# Patient Record
Sex: FEMALE | Race: WHITE | NOT HISPANIC OR LATINO | URBAN - METROPOLITAN AREA
[De-identification: names, ages, dates, MRNs, and addresses within clinical notes are randomized per-mention and may not be internally consistent; named-entity substitution may affect disease eponyms.]

---

## 2018-03-29 ENCOUNTER — INPATIENT HOSPITAL (OUTPATIENT)
Dept: URBAN - METROPOLITAN AREA HOSPITAL 107 | Facility: HOSPITAL | Age: 63
End: 2018-03-29
Payer: MEDICARE

## 2018-03-29 DIAGNOSIS — R10.31 RIGHT LOWER QUADRANT PAIN: ICD-10-CM

## 2018-03-29 DIAGNOSIS — R93.3 ABNORMAL FINDINGS ON DIAGNOSTIC IMAGING OF OTHER PARTS OF DI: ICD-10-CM

## 2018-03-29 DIAGNOSIS — R18.8 OTHER ASCITES: ICD-10-CM

## 2018-03-29 DIAGNOSIS — R93.2 ABNORMAL FINDINGS ON DIAGNOSTIC IMAGING OF LIVER AND BILIARY: ICD-10-CM

## 2018-03-29 DIAGNOSIS — K37 UNSPECIFIED APPENDICITIS: ICD-10-CM

## 2018-03-29 PROCEDURE — 99253 IP/OBS CNSLTJ NEW/EST LOW 45: CPT

## 2018-05-21 ENCOUNTER — OFFICE VISIT (OUTPATIENT)
Dept: GASTROENTEROLOGY | Facility: CLINIC | Age: 63
End: 2018-05-21

## 2018-05-21 VITALS
DIASTOLIC BLOOD PRESSURE: 66 MMHG | WEIGHT: 287.8 LBS | HEIGHT: 64 IN | SYSTOLIC BLOOD PRESSURE: 124 MMHG | BODY MASS INDEX: 49.13 KG/M2 | TEMPERATURE: 97.9 F

## 2018-05-21 DIAGNOSIS — K63.5 POLYP OF COLON, UNSPECIFIED PART OF COLON, UNSPECIFIED TYPE: ICD-10-CM

## 2018-05-21 DIAGNOSIS — K74.69 OTHER CIRRHOSIS OF LIVER (HCC): Primary | ICD-10-CM

## 2018-05-21 PROCEDURE — 99204 OFFICE O/P NEW MOD 45 MIN: CPT | Performed by: INTERNAL MEDICINE

## 2018-05-21 RX ORDER — ATORVASTATIN CALCIUM 20 MG/1
TABLET, FILM COATED ORAL
COMMUNITY

## 2018-05-21 RX ORDER — FUROSEMIDE 40 MG/1
TABLET ORAL
COMMUNITY

## 2018-05-21 RX ORDER — LISINOPRIL 20 MG/1
TABLET ORAL
COMMUNITY

## 2018-05-21 RX ORDER — DIGOXIN 250 MCG
TABLET ORAL DAILY
COMMUNITY
Start: 2014-02-21

## 2018-05-21 NOTE — PROGRESS NOTES
Chief Complaint   Patient presents with   • Cirrhosis       History of Present Illness: 63 yo female who was told that she has cirrhosis in 3/18 found on CT abd/pelvis. No abdominal or chest pain. No nausea or vomiting. No ffevers, chills. Her weight has been stable. NO SOA except FRAZIER. No rectal bleeding or melena. Occasional diarrhea, rare constiaption. NO jaundice or hepatitis. She is now getting the hep A and Hep B vaccines. Rare ETOH. NO IV drugs.     Past Medical History:   Diagnosis Date   • A-fib    • Congestive heart failure    • Diabetes mellitus    • History of breast cancer    • History of uterine cancer    • Hypertension    • Uterine cancer        Past Surgical History:   Procedure Laterality Date   • APPENDECTOMY     • BILATERAL SALPINGO OOPHORECTOMY     • BREAST BIOPSY  1997   • BREAST LUMPECTOMY  1998   • COLONOSCOPY  12/01/2014    [facility]   • HAND SURGERY     • HYSTERECTOMY  2005   • MEDIPORT INSERTION, SINGLE  1998         Current Outpatient Prescriptions:   •  atorvastatin (LIPITOR) 20 MG tablet, atorvastatin 20 mg tablet, Disp: , Rfl:   •  digoxin (LANOXIN) 250 MCG tablet, Take  by mouth Daily., Disp: , Rfl:   •  furosemide (LASIX) 40 MG tablet, furosemide 40 mg tablet, Disp: , Rfl:   •  FUROSEMIDE PO, Take  by mouth., Disp: , Rfl:   •  glucose blood test strip, Accu-Chek Compact Test strips, Disp: , Rfl:   •  hepatitis A-hepatitis B (TWINRIX) 720-20 ELU-MCG/ML injection, Twinrix (PF) 720 SHEN unit-20 mcg/mL intramuscular syringe, Disp: , Rfl:   •  Insulin Glargine 300 UNIT/ML solution pen-injector, Inject 75 Units under the skin., Disp: , Rfl:   •  insulin lispro (HUMALOG) 100 UNIT/ML injection, Humalog U-100 Insulin 100 unit/mL subcutaneous solution  40 units tid with meals, Disp: , Rfl:   •  lisinopril (PRINIVIL,ZESTRIL) 20 MG tablet, lisinopril 20 mg tablet, Disp: , Rfl:   •  Multiple Vitamins-Minerals (CENTRUM SILVER 50+MEN PO), Centrum Silver  1 A DAY, Disp: , Rfl:   •  Polyethylene Glycol 3350  (MIRALAX PO), 17 g., Disp: , Rfl:     Allergies   Allergen Reactions   • Penicillins Shortness Of Breath and Swelling       History reviewed. No pertinent family history.    Social History     Social History   • Marital status:      Spouse name: N/A   • Number of children: N/A   • Years of education: N/A     Occupational History   • Not on file.     Social History Main Topics   • Smoking status: Former Smoker   • Smokeless tobacco: Never Used   • Alcohol use No   • Drug use: No   • Sexual activity: Not on file     Other Topics Concern   • Not on file     Social History Narrative   • No narrative on file       Review of Systems   All other systems reviewed and are negative.      Vitals:    05/21/18 0930   BP: 124/66   Temp: 97.9 °F (36.6 °C)       Physical Exam   Constitutional: She is oriented to person, place, and time. She appears well-developed and well-nourished. No distress.   HENT:   Head: Normocephalic and atraumatic. Hair is normal.   Right Ear: Hearing, tympanic membrane, external ear and ear canal normal. No drainage. No decreased hearing is noted.   Left Ear: Hearing, tympanic membrane, external ear and ear canal normal. No decreased hearing is noted.   Nose: No nasal deformity.   Mouth/Throat: Oropharynx is clear and moist.   Eyes: Conjunctivae, EOM and lids are normal. Pupils are equal, round, and reactive to light. Right eye exhibits no discharge. Left eye exhibits no discharge.   Neck: Normal range of motion. Neck supple. No JVD present. No tracheal deviation present. No thyromegaly present.   Cardiovascular: Normal rate, regular rhythm, normal heart sounds, intact distal pulses and normal pulses.  Exam reveals no gallop and no friction rub.    No murmur heard.  Pulmonary/Chest: Effort normal and breath sounds normal. No respiratory distress. She has no wheezes. She has no rales. She exhibits no tenderness.   Abdominal: Soft. Bowel sounds are normal. She exhibits no distension and no mass.  There is no tenderness. There is no rebound and no guarding. No hernia.   Genitourinary: Rectal exam shows guaiac negative stool.   Musculoskeletal: Normal range of motion. She exhibits no edema, tenderness or deformity.   Lymphadenopathy:     She has no cervical adenopathy.   Neurological: She is alert and oriented to person, place, and time. She has normal reflexes. She displays normal reflexes. No cranial nerve deficit. She exhibits normal muscle tone. Coordination normal.   Skin: Skin is warm and dry. No rash noted. She is not diaphoretic. No erythema.   Psychiatric: She has a normal mood and affect. Her behavior is normal. Judgment and thought content normal.   Vitals reviewed.      Rozina was seen today for cirrhosis.    Diagnoses and all orders for this visit:    Other cirrhosis of liver  -     Celiac Ab tTG DGP TIgA  -     Comprehensive Metabolic Panel  -     Iron Profile  -     Protein Elec + Interp, Serum  -     Ferritin  -     Iron Profile  -     AFP Tumor Marker  -     Protime-INR  -     AREN  -     Anti-Smooth Muscle Antibody Titer  -     Ceruloplasmin  -     Mitochondrial Antibodies, M2    Polyp of colon, unspecified part of colon, unspecified type       Assessment:  1) h/o colon polyps  2) Fatty liver  3) Cirrhosis    Recommendations:  1) Labs:   2) Weight loss !!  3) US of the liver every 6 mos.  4) f/u 6 mos. Consider a c/s + EGD      No Follow-up on file.    Brown Pond MD  5/21/2018

## 2018-05-22 LAB
ACTIN IGG SERPL-ACNC: 10 UNITS (ref 0–19)
AFP-TM SERPL-MCNC: 5.7 NG/ML (ref 0–8.3)
ALBUMIN SERPL ELPH-MCNC: 3.1 G/DL (ref 2.9–4.4)
ALBUMIN SERPL-MCNC: 4.2 G/DL (ref 3.5–5.2)
ALBUMIN/GLOB SERPL: 0.9 {RATIO} (ref 0.7–1.7)
ALBUMIN/GLOB SERPL: 1.7 G/DL
ALP SERPL-CCNC: 85 U/L (ref 39–117)
ALPHA1 GLOB SERPL ELPH-MCNC: 0.2 G/DL (ref 0–0.4)
ALPHA2 GLOB SERPL ELPH-MCNC: 0.6 G/DL (ref 0.4–1)
ALT SERPL-CCNC: 7 U/L (ref 1–33)
ANA SER QL: NEGATIVE
AST SERPL-CCNC: 10 U/L (ref 1–32)
B-GLOBULIN SERPL ELPH-MCNC: 1.1 G/DL (ref 0.7–1.3)
BILIRUB SERPL-MCNC: 0.4 MG/DL (ref 0.1–1.2)
BUN SERPL-MCNC: 17 MG/DL (ref 8–23)
BUN/CREAT SERPL: 24.6 (ref 7–25)
CALCIUM SERPL-MCNC: 8.8 MG/DL (ref 8.6–10.5)
CERULOPLASMIN SERPL-MCNC: 22.5 MG/DL (ref 19–39)
CHLORIDE SERPL-SCNC: 102 MMOL/L (ref 98–107)
CO2 SERPL-SCNC: 24.5 MMOL/L (ref 22–29)
CREAT SERPL-MCNC: 0.69 MG/DL (ref 0.57–1)
FERRITIN SERPL-MCNC: 61.81 NG/ML (ref 13–150)
GAMMA GLOB SERPL ELPH-MCNC: 1.7 G/DL (ref 0.4–1.8)
GFR SERPLBLD CREATININE-BSD FMLA CKD-EPI: 105 ML/MIN/1.73
GFR SERPLBLD CREATININE-BSD FMLA CKD-EPI: 86 ML/MIN/1.73
GLIADIN PEPTIDE IGA SER-ACNC: 9 UNITS (ref 0–19)
GLIADIN PEPTIDE IGG SER-ACNC: 5 UNITS (ref 0–19)
GLOBULIN SER CALC-MCNC: 2.5 GM/DL
GLOBULIN SER CALC-MCNC: 3.6 G/DL (ref 2.2–3.9)
GLUCOSE SERPL-MCNC: 99 MG/DL (ref 65–99)
IGA SERPL-MCNC: 747 MG/DL (ref 87–352)
INR PPP: 1.21 (ref 0.9–1.1)
IRON SATN MFR SERPL: 16 % (ref 20–50)
IRON SERPL-MCNC: 55 MCG/DL (ref 37–145)
LABORATORY COMMENT REPORT: NORMAL
M PROTEIN SERPL ELPH-MCNC: NORMAL G/DL
MITOCHONDRIA M2 IGG SER-ACNC: <20 UNITS (ref 0–20)
POTASSIUM SERPL-SCNC: 4.7 MMOL/L (ref 3.5–5.2)
PROT PATTERN SERPL ELPH-IMP: NORMAL
PROT SERPL-MCNC: 6.7 G/DL (ref 6–8.5)
PROTHROMBIN TIME: 15.1 SECONDS (ref 11.7–14.2)
SODIUM SERPL-SCNC: 143 MMOL/L (ref 136–145)
TIBC SERPL-MCNC: 338 MCG/DL
TTG IGA SER-ACNC: 2 U/ML (ref 0–3)
TTG IGG SER-ACNC: 3 U/ML (ref 0–5)
UIBC SERPL-MCNC: 283 MCG/DL

## 2018-05-22 NOTE — PROGRESS NOTES
Tell her that her labs look good, suggesting that her cirrhosis is from fatty liver?  I would continue weight loss.

## 2018-05-25 ENCOUNTER — TELEPHONE (OUTPATIENT)
Dept: GASTROENTEROLOGY | Facility: CLINIC | Age: 63
End: 2018-05-25

## 2018-06-08 NOTE — TELEPHONE ENCOUNTER
Call from pt.  Advise per Dr Pond that labs look good, suggesting that cirrhosis is from fatty liver.  Would continue wt loss.    Pt verb understanding.  Asking if still needs to have US every 6 mo's as advsied with o/v of 5/21.  If so, needs order placed.    Message to Dr Pond.

## 2018-06-08 NOTE — TELEPHONE ENCOUNTER
Tell her that since she has cirrhosis her risk of getting liver cancer is fairly high so it is recommended that you have an US of the liver (or other liver imaging modality) every 6 mos. If she is in agreement then please order this US liver to be done every 6 mos and I will cosign it. [email or web address]

## 2018-06-25 ENCOUNTER — TELEPHONE (OUTPATIENT)
Dept: GASTROENTEROLOGY | Facility: CLINIC | Age: 63
End: 2018-06-25

## 2018-06-25 DIAGNOSIS — K74.69 OTHER CIRRHOSIS OF LIVER (HCC): Primary | ICD-10-CM

## 2018-06-25 NOTE — TELEPHONE ENCOUNTER
----- Message from Katiuska Estrada RN sent at 6/25/2018 11:18 AM EDT -----  Regarding: Patient returned call regarding testing  Patient stated that she received a letter in the mail stating that she needed to schedule testing(she is not sure what kind of testing).  #  [phone]

## 2018-06-25 NOTE — TELEPHONE ENCOUNTER
See note of 5/25.    Call to pt.  Advise per DR Pond that because has cirrhosis, risk of getting liver cancer is fairly high.  Is recommended have an US of the liver every 6 mo's.     Pt in agreement.      Standing order placed for liver us every 6 mo's x4.  Message to Dr Pond.

## 2018-06-29 ENCOUNTER — HOSPITAL ENCOUNTER (OUTPATIENT)
Dept: ULTRASOUND IMAGING | Facility: HOSPITAL | Age: 63
Discharge: HOME | End: 2018-06-29
Attending: INTERNAL MEDICINE | Admitting: INTERNAL MEDICINE

## 2018-06-29 DIAGNOSIS — K74.69 OTHER CIRRHOSIS OF LIVER (HCC): ICD-10-CM

## 2018-06-29 PROCEDURE — 76705 ECHO EXAM OF ABDOMEN: CPT

## 2018-07-02 NOTE — PROGRESS NOTES
Tell her that her ultrasound of the liver does show cirrhosis but no evidence of any liver masses, which is good.  I recommend a repeat ultrasound of the liver in 6 months.

## 2018-08-15 ENCOUNTER — TELEPHONE (OUTPATIENT)
Dept: GASTROENTEROLOGY | Facility: CLINIC | Age: 63
End: 2018-08-15

## 2018-08-15 DIAGNOSIS — K74.60 CIRRHOSIS OF LIVER WITHOUT ASCITES, UNSPECIFIED HEPATIC CIRRHOSIS TYPE (HCC): Primary | ICD-10-CM

## 2018-08-15 NOTE — TELEPHONE ENCOUNTER
----- Message from Brown Pond MD sent at 7/2/2018  8:21 AM EDT -----  Tell her that her ultrasound of the liver does show cirrhosis but no evidence of any liver masses, which is good.  I recommend a repeat ultrasound of the liver in 6 months.

## 2018-08-15 NOTE — TELEPHONE ENCOUNTER
Called pt and advised per Dr Pond that the us of the liver does show cirrhosis but no evidence of any liver masses , which is good.  He recommends a repeat us of the liver in 6 mo. Pt verb understanding.   Us of liver ordered for 6 mo.

## 2018-11-29 ENCOUNTER — OFFICE VISIT (OUTPATIENT)
Dept: GASTROENTEROLOGY | Facility: CLINIC | Age: 63
End: 2018-11-29

## 2018-11-29 VITALS
SYSTOLIC BLOOD PRESSURE: 138 MMHG | DIASTOLIC BLOOD PRESSURE: 84 MMHG | BODY MASS INDEX: 49.64 KG/M2 | WEIGHT: 290.8 LBS | HEIGHT: 64 IN | TEMPERATURE: 97.9 F

## 2018-11-29 DIAGNOSIS — K63.5 POLYP OF COLON, UNSPECIFIED PART OF COLON, UNSPECIFIED TYPE: Primary | ICD-10-CM

## 2018-11-29 DIAGNOSIS — K75.81 NASH (NONALCOHOLIC STEATOHEPATITIS): ICD-10-CM

## 2018-11-29 DIAGNOSIS — K74.69 OTHER CIRRHOSIS OF LIVER (HCC): ICD-10-CM

## 2018-11-29 PROCEDURE — 99214 OFFICE O/P EST MOD 30 MIN: CPT | Performed by: INTERNAL MEDICINE

## 2018-11-29 RX ORDER — DULAGLUTIDE 1.5 MG/.5ML
INJECTION, SOLUTION SUBCUTANEOUS
COMMUNITY
Start: 2018-11-27

## 2018-11-29 RX ORDER — INSULIN DEGLUDEC 200 U/ML
INJECTION, SOLUTION SUBCUTANEOUS
COMMUNITY
Start: 2018-11-08

## 2018-11-29 NOTE — PROGRESS NOTES
Chief Complaint   Patient presents with   • Follow-up   • Cirrhosis       History of Present Illness: 64 yo female with MORENO cirrhosis. We reviewed her last colonoscopy done in 12/14. She feels OK. Energy level not great. NO itching. No abdominal or chest pain. No nausea or vomting. No diarrhea or constipation. No rectal bleeding or melena. She cannot lose weight.     Past Medical History:   Diagnosis Date   • A-fib (CMS/HCC)    • Congestive heart failure (CMS/HCC)    • Diabetes mellitus (CMS/HCC)    • History of breast cancer    • History of uterine cancer    • Hypertension    • Uterine cancer (CMS/HCC)        Past Surgical History:   Procedure Laterality Date   • APPENDECTOMY     • BILATERAL SALPINGO OOPHORECTOMY     • BREAST BIOPSY  1997   • BREAST LUMPECTOMY  1998   • COLONOSCOPY  12/01/2014    [facility]   • HAND SURGERY     • HYSTERECTOMY  2005   • MEDIPORT INSERTION, SINGLE  1998         Current Outpatient Medications:   •  atorvastatin (LIPITOR) 20 MG tablet, atorvastatin 20 mg tablet, Disp: , Rfl:   •  Cholecalciferol (VITAMIN D PO), ergocalciferol (vitamin D2) 50,000 unit capsule  1 a week, Disp: , Rfl:   •  digoxin (LANOXIN) 250 MCG tablet, Take  by mouth Daily., Disp: , Rfl:   •  furosemide (LASIX) 40 MG tablet, furosemide 40 mg tablet, Disp: , Rfl:   •  FUROSEMIDE PO, Take  by mouth., Disp: , Rfl:   •  Glucosamine HCl (GLUCOSAMINE PO), Take  by mouth., Disp: , Rfl:   •  glucose blood test strip, Accu-Chek Compact Test strips, Disp: , Rfl:   •  insulin lispro (HUMALOG) 100 UNIT/ML injection, Humalog U-100 Insulin 100 unit/mL subcutaneous solution  40 units tid with meals, Disp: , Rfl:   •  lisinopril (PRINIVIL,ZESTRIL) 20 MG tablet, lisinopril 20 mg tablet, Disp: , Rfl:   •  Multiple Vitamins-Minerals (CENTRUM SILVER 50+MEN PO), Centrum Silver  1 A DAY, Disp: , Rfl:   •  Polyethylene Glycol 3350 (MIRALAX PO), 17 g., Disp: , Rfl:   •  TRESIBA FLEXTOUCH 200 UNIT/ML solution pen-injector, , Disp: , Rfl:   •   TRULICITY 1.5 MG/0.5ML solution pen-injector, , Disp: , Rfl:   •  hepatitis A-hepatitis B (TWINRIX) 720-20 ELU-MCG/ML injection, Twinrix (PF) 720 SHEN unit-20 mcg/mL intramuscular syringe, Disp: , Rfl:   •  Insulin Glargine 300 UNIT/ML solution pen-injector, Inject 75 Units under the skin., Disp: , Rfl:     Allergies   Allergen Reactions   • Penicillins Shortness Of Breath and Swelling       History reviewed. No pertinent family history.    Social History     Socioeconomic History   • Marital status:      Spouse name: Not on file   • Number of children: Not on file   • Years of education: Not on file   • Highest education level: Not on file   Social Needs   • Financial resource strain: Not on file   • Food insecurity - worry: Not on file   • Food insecurity - inability: Not on file   • Transportation needs - medical: Not on file   • Transportation needs - non-medical: Not on file   Occupational History   • Not on file   Tobacco Use   • Smoking status: Former Smoker   • Smokeless tobacco: Never Used   Substance and Sexual Activity   • Alcohol use: No   • Drug use: No   • Sexual activity: Not on file   Other Topics Concern   • Not on file   Social History Narrative   • Not on file       Review of Systems   All other systems reviewed and are negative.      Vitals:    11/29/18 0917   BP: 138/84   Temp: 97.9 °F (36.6 °C)       Physical Exam   Constitutional: She is oriented to person, place, and time. She appears well-developed and well-nourished. No distress.   Obese   HENT:   Head: Normocephalic and atraumatic. Hair is normal.   Right Ear: Hearing, tympanic membrane, external ear and ear canal normal. No drainage. No decreased hearing is noted.   Left Ear: Hearing, tympanic membrane, external ear and ear canal normal. No decreased hearing is noted.   Nose: No nasal deformity.   Mouth/Throat: Oropharynx is clear and moist.   Eyes: Conjunctivae, EOM and lids are normal. Pupils are equal, round, and reactive to  light. Right eye exhibits no discharge. Left eye exhibits no discharge.   Neck: Normal range of motion. Neck supple. No JVD present. No tracheal deviation present. No thyromegaly present.   Cardiovascular: Normal rate, regular rhythm, normal heart sounds, intact distal pulses and normal pulses. Exam reveals no gallop and no friction rub.   No murmur heard.  Pulmonary/Chest: Effort normal and breath sounds normal. No respiratory distress. She has no wheezes. She has no rales. She exhibits no tenderness.   Abdominal: Soft. Bowel sounds are normal. She exhibits no distension and no mass. There is no tenderness. There is no rebound and no guarding. No hernia.   obese   Musculoskeletal: Normal range of motion. She exhibits no edema, tenderness or deformity.   Lymphadenopathy:     She has no cervical adenopathy.   Neurological: She is alert and oriented to person, place, and time. She has normal reflexes. She displays normal reflexes. No cranial nerve deficit. She exhibits normal muscle tone. Coordination normal.   Skin: Skin is warm and dry. No rash noted. She is not diaphoretic. No erythema.   Psychiatric: She has a normal mood and affect. Her behavior is normal. Judgment and thought content normal.   Vitals reviewed.      Rozina was seen today for follow-up and cirrhosis.    Diagnoses and all orders for this visit:    Polyp of colon, unspecified part of colon, unspecified type  -     Case Request; Standing  -     Case Request    Other cirrhosis of liver (CMS/HCC)  -     US Liver; Future    MORENO (nonalcoholic steatohepatitis)  -     US Liver; Future    Other orders  -     Follow Anesthesia Guidelines / Standing Orders; Future  -     Implement Anesthesia orders day of procedure.; Standing  -     Obtain informed consent; Standing  -     Verify bowel prep was successful; Standing  -     Give tap water enema if bowel prep was insufficient; Standing      Assessment:  1) MORENO cirrhosis.  2) h/o colon  polyps.    Recommendations:  1) US of the liver  2) Colonoscopy  3) Lose weight    No Follow-up on file.    Brown Pond MD  11/29/2018

## 2018-12-14 ENCOUNTER — ANESTHESIA (OUTPATIENT)
Dept: GASTROENTEROLOGY | Facility: HOSPITAL | Age: 63
End: 2018-12-14

## 2018-12-14 ENCOUNTER — HOSPITAL ENCOUNTER (OUTPATIENT)
Facility: HOSPITAL | Age: 63
Setting detail: HOSPITAL OUTPATIENT SURGERY
Discharge: HOME | End: 2018-12-14
Attending: INTERNAL MEDICINE | Admitting: INTERNAL MEDICINE

## 2018-12-14 ENCOUNTER — ANESTHESIA EVENT (OUTPATIENT)
Dept: GASTROENTEROLOGY | Facility: HOSPITAL | Age: 63
End: 2018-12-14

## 2018-12-14 VITALS
DIASTOLIC BLOOD PRESSURE: 65 MMHG | OXYGEN SATURATION: 97 % | RESPIRATION RATE: 16 BRPM | WEIGHT: 289.2 LBS | HEIGHT: 64 IN | TEMPERATURE: 97.6 F | BODY MASS INDEX: 49.37 KG/M2 | HEART RATE: 81 BPM | SYSTOLIC BLOOD PRESSURE: 118 MMHG

## 2018-12-14 DIAGNOSIS — K63.5 POLYP OF COLON, UNSPECIFIED PART OF COLON, UNSPECIFIED TYPE: ICD-10-CM

## 2018-12-14 LAB
GLUCOSE BLDC GLUCOMTR-MCNC: 108 MG/DL (ref 70–130)
GLUCOSE BLDC GLUCOMTR-MCNC: 76 MG/DL (ref 70–130)

## 2018-12-14 PROCEDURE — 25010000002 PROPOFOL 10 MG/ML EMULSION: Performed by: ANESTHESIOLOGY

## 2018-12-14 PROCEDURE — 88305 TISSUE EXAM BY PATHOLOGIST: CPT | Performed by: INTERNAL MEDICINE

## 2018-12-14 PROCEDURE — 45380 COLONOSCOPY AND BIOPSY: CPT | Performed by: INTERNAL MEDICINE

## 2018-12-14 PROCEDURE — 82962 GLUCOSE BLOOD TEST: CPT

## 2018-12-14 RX ORDER — SODIUM CHLORIDE 0.9 % (FLUSH) 0.9 %
3 SYRINGE (ML) INJECTION AS NEEDED
Status: DISCONTINUED | OUTPATIENT
Start: 2018-12-14 | End: 2018-12-14 | Stop reason: HOSPADM

## 2018-12-14 RX ORDER — LIDOCAINE HYDROCHLORIDE 20 MG/ML
INJECTION, SOLUTION INFILTRATION; PERINEURAL AS NEEDED
Status: DISCONTINUED | OUTPATIENT
Start: 2018-12-14 | End: 2018-12-14 | Stop reason: SURG

## 2018-12-14 RX ORDER — SODIUM CHLORIDE, SODIUM LACTATE, POTASSIUM CHLORIDE, CALCIUM CHLORIDE 600; 310; 30; 20 MG/100ML; MG/100ML; MG/100ML; MG/100ML
1000 INJECTION, SOLUTION INTRAVENOUS CONTINUOUS
Status: DISCONTINUED | OUTPATIENT
Start: 2018-12-14 | End: 2018-12-14 | Stop reason: HOSPADM

## 2018-12-14 RX ORDER — DEXTROSE, SODIUM CHLORIDE, SODIUM LACTATE, POTASSIUM CHLORIDE, AND CALCIUM CHLORIDE 5; .6; .31; .03; .02 G/100ML; G/100ML; G/100ML; G/100ML; G/100ML
INJECTION, SOLUTION INTRAVENOUS CONTINUOUS PRN
Status: DISCONTINUED | OUTPATIENT
Start: 2018-12-14 | End: 2018-12-14 | Stop reason: SURG

## 2018-12-14 RX ORDER — LIDOCAINE HYDROCHLORIDE 10 MG/ML
0.5 INJECTION, SOLUTION INFILTRATION; PERINEURAL ONCE AS NEEDED
Status: DISCONTINUED | OUTPATIENT
Start: 2018-12-14 | End: 2018-12-14 | Stop reason: HOSPADM

## 2018-12-14 RX ORDER — PROPOFOL 10 MG/ML
VIAL (ML) INTRAVENOUS CONTINUOUS PRN
Status: DISCONTINUED | OUTPATIENT
Start: 2018-12-14 | End: 2018-12-14 | Stop reason: SURG

## 2018-12-14 RX ORDER — PROPOFOL 10 MG/ML
VIAL (ML) INTRAVENOUS AS NEEDED
Status: DISCONTINUED | OUTPATIENT
Start: 2018-12-14 | End: 2018-12-14 | Stop reason: SURG

## 2018-12-14 RX ADMIN — PROPOFOL 120 MG: 10 INJECTION, EMULSION INTRAVENOUS at 15:23

## 2018-12-14 RX ADMIN — PROPOFOL 160 MCG/KG/MIN: 10 INJECTION, EMULSION INTRAVENOUS at 15:23

## 2018-12-14 RX ADMIN — LIDOCAINE HYDROCHLORIDE 60 MG: 20 INJECTION, SOLUTION INFILTRATION; PERINEURAL at 15:23

## 2018-12-14 RX ADMIN — SODIUM CHLORIDE, SODIUM LACTATE, POTASSIUM CHLORIDE, CALCIUM CHLORIDE AND DEXTROSE MONOHYDRATE: 5; 600; 310; 30; 20 INJECTION, SOLUTION INTRAVENOUS at 15:13

## 2018-12-14 RX ADMIN — SODIUM CHLORIDE, POTASSIUM CHLORIDE, SODIUM LACTATE AND CALCIUM CHLORIDE 1000 ML: 600; 310; 30; 20 INJECTION, SOLUTION INTRAVENOUS at 14:51

## 2018-12-14 NOTE — ANESTHESIA PREPROCEDURE EVALUATION
Anesthesia Evaluation     Patient summary reviewed and Nursing notes reviewed   NPO Solid Status: > 8 hours  NPO Liquid Status: > 2 hours           Airway   Mallampati: II  TM distance: >3 FB  Neck ROM: full  Dental - normal exam     Pulmonary - normal exam    breath sounds clear to auscultation  (+) a smoker Former,   Cardiovascular - normal exam  Exercise tolerance: good (4-7 METS)    Rhythm: regular  Rate: normal    (+) hypertension, dysrhythmias Atrial Fib, CHF, hyperlipidemia,   (-) angina, orthopnea, PND, FRAZIER      Neuro/Psych- negative ROS  GI/Hepatic/Renal/Endo    (+) morbid obesity,  diabetes mellitus using insulin,     Musculoskeletal     Abdominal    Substance History - negative use     OB/GYN negative ob/gyn ROS         Other   (+) arthritis   history of cancer                    Anesthesia Plan    ASA 3     MAC     Anesthetic plan, all risks, benefits, and alternatives have been provided, discussed and informed consent has been obtained with: patient.

## 2018-12-14 NOTE — H&P
Chief Complaint   Patient presents with   • Follow-up   • Cirrhosis         History of Present Illness: 62 yo female with MORENO cirrhosis. We reviewed her last colonoscopy done in 12/14. She feels OK. Energy level not great. NO itching. No abdominal or chest pain. No nausea or vomting. No diarrhea or constipation. No rectal bleeding or melena. She cannot lose weight.      Medical History        Past Medical History:   Diagnosis Date   • A-fib (CMS/HCC)     • Congestive heart failure (CMS/HCC)     • Diabetes mellitus (CMS/HCC)     • History of breast cancer     • History of uterine cancer     • Hypertension     • Uterine cancer (CMS/HCC)              Surgical History         Past Surgical History:   Procedure Laterality Date   • APPENDECTOMY       • BILATERAL SALPINGO OOPHORECTOMY       • BREAST BIOPSY   1997   • BREAST LUMPECTOMY   1998   • COLONOSCOPY   12/01/2014     [facility]   • HAND SURGERY       • HYSTERECTOMY   2005   • MEDIPORT INSERTION, SINGLE   1998               Current Outpatient Medications:   •  atorvastatin (LIPITOR) 20 MG tablet, atorvastatin 20 mg tablet, Disp: , Rfl:   •  Cholecalciferol (VITAMIN D PO), ergocalciferol (vitamin D2) 50,000 unit capsule  1 a week, Disp: , Rfl:   •  digoxin (LANOXIN) 250 MCG tablet, Take  by mouth Daily., Disp: , Rfl:   •  furosemide (LASIX) 40 MG tablet, furosemide 40 mg tablet, Disp: , Rfl:   •  FUROSEMIDE PO, Take  by mouth., Disp: , Rfl:   •  Glucosamine HCl (GLUCOSAMINE PO), Take  by mouth., Disp: , Rfl:   •  glucose blood test strip, Accu-Chek Compact Test strips, Disp: , Rfl:   •  insulin lispro (HUMALOG) 100 UNIT/ML injection, Humalog U-100 Insulin 100 unit/mL subcutaneous solution  40 units tid with meals, Disp: , Rfl:   •  lisinopril (PRINIVIL,ZESTRIL) 20 MG tablet, lisinopril 20 mg tablet, Disp: , Rfl:   •  Multiple Vitamins-Minerals (CENTRUM SILVER 50+MEN PO), Centrum Silver  1 A DAY, Disp: , Rfl:   •  Polyethylene Glycol 3350 (MIRALAX PO), 17 g., Disp: , Rfl:    •  TRESIBA FLEXTOUCH 200 UNIT/ML solution pen-injector, , Disp: , Rfl:   •  TRULICITY 1.5 MG/0.5ML solution pen-injector, , Disp: , Rfl:   •  hepatitis A-hepatitis B (TWINRIX) 720-20 ELU-MCG/ML injection, Twinrix (PF) 720 SHEN unit-20 mcg/mL intramuscular syringe, Disp: , Rfl:   •  Insulin Glargine 300 UNIT/ML solution pen-injector, Inject 75 Units under the skin., Disp: , Rfl:           Allergies   Allergen Reactions   • Penicillins Shortness Of Breath and Swelling         History reviewed. No pertinent family history.     Social History               Socioeconomic History   • Marital status:        Spouse name: Not on file   • Number of children: Not on file   • Years of education: Not on file   • Highest education level: Not on file   Social Needs   • Financial resource strain: Not on file   • Food insecurity - worry: Not on file   • Food insecurity - inability: Not on file   • Transportation needs - medical: Not on file   • Transportation needs - non-medical: Not on file   Occupational History   • Not on file   Tobacco Use   • Smoking status: Former Smoker   • Smokeless tobacco: Never Used   Substance and Sexual Activity   • Alcohol use: No   • Drug use: No   • Sexual activity: Not on file   Other Topics Concern   • Not on file   Social History Narrative   • Not on file            Review of Systems   All other systems reviewed and are negative.            Vitals:     11/29/18 0917   BP: 138/84   Temp: 97.9 °F (36.6 °C)         Physical Exam   Constitutional: She is oriented to person, place, and time. She appears well-developed and well-nourished. No distress.   Obese   HENT:   Head: Normocephalic and atraumatic. Hair is normal.   Right Ear: Hearing, tympanic membrane, external ear and ear canal normal. No drainage. No decreased hearing is noted.   Left Ear: Hearing, tympanic membrane, external ear and ear canal normal. No decreased hearing is noted.   Nose: No nasal deformity.   Mouth/Throat:  Oropharynx is clear and moist.   Eyes: Conjunctivae, EOM and lids are normal. Pupils are equal, round, and reactive to light. Right eye exhibits no discharge. Left eye exhibits no discharge.   Neck: Normal range of motion. Neck supple. No JVD present. No tracheal deviation present. No thyromegaly present.   Cardiovascular: Normal rate, regular rhythm, normal heart sounds, intact distal pulses and normal pulses. Exam reveals no gallop and no friction rub.   No murmur heard.  Pulmonary/Chest: Effort normal and breath sounds normal. No respiratory distress. She has no wheezes. She has no rales. She exhibits no tenderness.   Abdominal: Soft. Bowel sounds are normal. She exhibits no distension and no mass. There is no tenderness. There is no rebound and no guarding. No hernia.   obese   Musculoskeletal: Normal range of motion. She exhibits no edema, tenderness or deformity.   Lymphadenopathy:     She has no cervical adenopathy.   Neurological: She is alert and oriented to person, place, and time. She has normal reflexes. She displays normal reflexes. No cranial nerve deficit. She exhibits normal muscle tone. Coordination normal.   Skin: Skin is warm and dry. No rash noted. She is not diaphoretic. No erythema.   Psychiatric: She has a normal mood and affect. Her behavior is normal. Judgment and thought content normal.   Vitals reviewed.        Rozina was seen today for follow-up and cirrhosis.     Diagnoses and all orders for this visit:     Polyp of colon, unspecified part of colon, unspecified type  -     Case Request; Standing  -     Case Request     Other cirrhosis of liver (CMS/HCC)  -     US Liver; Future     MORENO (nonalcoholic steatohepatitis)  -     US Liver; Future     Other orders  -     Follow Anesthesia Guidelines / Standing Orders; Future  -     Implement Anesthesia orders day of procedure.; Standing  -     Obtain informed consent; Standing  -     Verify bowel prep was successful; Standing  -     Give tap  water enema if bowel prep was insufficient; Standing        Assessment:  1) MORENO cirrhosis.  2) h/o colon polyps.     Recommendations:  1) US of the liver  2) Colonoscopy  3) Lose weight     No Follow-up on file.     12/14/18 - No change from the above H Deon Pond MD

## 2018-12-14 NOTE — ANESTHESIA POSTPROCEDURE EVALUATION
"Patient: Rozina Price    Procedure Summary     Date:  12/14/18 Room / Location:   [facility]    Anesthesia Start:  1513 Anesthesia Stop:  1545    Procedure:  COLONOSCOPY to cecum and ti with bx cold polypectomy (N/A ) Diagnosis:       Polyp of colon, unspecified part of colon, unspecified type      (Polyp of colon, unspecified part of colon, unspecified type [K63.5])    Surgeon:  Brown Pond MD Provider:  Joby Reaves MD    Anesthesia Type:  MAC ASA Status:  3          Anesthesia Type: MAC  Last vitals  BP   118/65 (12/14/18 1606)   Temp   36.4 °C (97.6 °F) (12/14/18 1446)   Pulse   81 (12/14/18 1606)   Resp   16 (12/14/18 1606)     SpO2   97 % (12/14/18 1606)     Post Anesthesia Care and Evaluation    Patient location during evaluation: bedside  Patient participation: complete - patient participated  Level of consciousness: awake and alert  Pain management: adequate  Airway patency: patent  Anesthetic complications: No anesthetic complications    Cardiovascular status: acceptable  Respiratory status: acceptable  Hydration status: acceptable    Comments: /65 (BP Location: Left arm, Patient Position: Lying)   Pulse 81   Temp 36.4 °C (97.6 °F) (Oral)   Resp 16   Ht 162.6 cm (64\")   Wt 131 kg (289 lb 3.2 oz)   SpO2 97%   BMI 49.64 kg/m²       "

## 2018-12-17 ENCOUNTER — HOSPITAL ENCOUNTER (OUTPATIENT)
Dept: ULTRASOUND IMAGING | Facility: HOSPITAL | Age: 63
Discharge: HOME | End: 2018-12-17
Attending: INTERNAL MEDICINE | Admitting: INTERNAL MEDICINE

## 2018-12-17 DIAGNOSIS — K74.69 OTHER CIRRHOSIS OF LIVER (HCC): ICD-10-CM

## 2018-12-17 DIAGNOSIS — K75.81 NASH (NONALCOHOLIC STEATOHEPATITIS): ICD-10-CM

## 2018-12-17 LAB
CYTO UR: NORMAL
LAB AP CASE REPORT: NORMAL
PATH REPORT.FINAL DX SPEC: NORMAL
PATH REPORT.GROSS SPEC: NORMAL

## 2018-12-17 PROCEDURE — 76705 ECHO EXAM OF ABDOMEN: CPT

## 2018-12-18 ENCOUNTER — TELEPHONE (OUTPATIENT)
Dept: GASTROENTEROLOGY | Facility: CLINIC | Age: 63
End: 2018-12-18

## 2018-12-18 NOTE — TELEPHONE ENCOUNTER
Call to pt. Advise per DR Castillo that US of the liver shows an enlarged liver, but no other abnormalities, which is good.  Pt verb understanding.

## 2018-12-18 NOTE — TELEPHONE ENCOUNTER
----- Message from Brown Pond MD sent at 12/17/2018  7:25 PM EST -----  Tell her that her US of the liver shows an enlarged liver but no other abnormalities, which is good. . Thx. kjh

## 2018-12-27 ENCOUNTER — TELEPHONE (OUTPATIENT)
Dept: GASTROENTEROLOGY | Facility: CLINIC | Age: 63
End: 2018-12-27

## 2018-12-27 NOTE — TELEPHONE ENCOUNTER
----- Message from Brown Pond MD sent at 12/26/2018  5:21 PM EST -----  Tell her that the colon polyps that were removed were not cancerous and only one was precancerous. I recommend a repeat colonoscopy in 5 yrs. Thx. kj

## 2018-12-27 NOTE — TELEPHONE ENCOUNTER
Called pt and advised per Dr Pond that the colon polyps that were removed were not cancerous and only one was precancerous.  He recommends a repeat c/s in 5 yrs. Pt verb understanding.     C/s placed in recall for 12/14/2023.

## 2019-02-11 ENCOUNTER — APPOINTMENT (OUTPATIENT)
Dept: ULTRASOUND IMAGING | Facility: HOSPITAL | Age: 64
End: 2019-02-11
Attending: INTERNAL MEDICINE

## 2019-06-21 ENCOUNTER — RESULTS ENCOUNTER (OUTPATIENT)
Dept: GASTROENTEROLOGY | Facility: CLINIC | Age: 64
End: 2019-06-21

## 2019-06-21 DIAGNOSIS — K74.69 OTHER CIRRHOSIS OF LIVER (HCC): ICD-10-CM

## 2020-10-23 ENCOUNTER — TELEPHONE (OUTPATIENT)
Dept: GASTROENTEROLOGY | Facility: CLINIC | Age: 65
End: 2020-10-23

## 2020-10-23 NOTE — TELEPHONE ENCOUNTER
----- Message from Pradip Coffey Rep sent at 10/23/2020  9:59 AM EDT -----  Regarding: ibuprofen  Contact: [phone]  Pt is having blood in her stool and has appt with Nelda on Tuesday but she is wanting to know should she stop her ibuprofen.

## 2020-10-23 NOTE — TELEPHONE ENCOUNTER
Yes it would be a good idea to stop any NSAIDs if she is bleeding.  If the bleeding is worse she should go to the ER for further evaluation.

## 2021-07-03 ENCOUNTER — INPATIENT HOSPITAL (OUTPATIENT)
Dept: URBAN - METROPOLITAN AREA HOSPITAL 107 | Facility: HOSPITAL | Age: 66
End: 2021-07-03
Payer: MEDICARE

## 2021-07-03 DIAGNOSIS — R63.0 ANOREXIA: ICD-10-CM

## 2021-07-03 DIAGNOSIS — K62.5 HEMORRHAGE OF ANUS AND RECTUM: ICD-10-CM

## 2021-07-03 DIAGNOSIS — R06.00 DYSPNEA, UNSPECIFIED: ICD-10-CM

## 2021-07-03 DIAGNOSIS — R11.0 NAUSEA: ICD-10-CM

## 2021-07-03 PROCEDURE — 99223 1ST HOSP IP/OBS HIGH 75: CPT | Performed by: INTERNAL MEDICINE

## 2021-07-04 PROCEDURE — 99232 SBSQ HOSP IP/OBS MODERATE 35: CPT | Performed by: INTERNAL MEDICINE

## (undated) DEVICE — CANN NASL CO2 TRULINK W/O2 A/

## (undated) DEVICE — THE TORRENT IRRIGATION SCOPE CONNECTOR IS USED WITH THE TORRENT IRRIGATION TUBING TO PROVIDE IRRIGATION FLUIDS SUCH AS STERILE WATER DURING GASTROINTESTINAL ENDOSCOPIC PROCEDURES WHEN USED IN CONJUNCTION WITH AN IRRIGATION PUMP (OR ELECTROSURGICAL UNIT).: Brand: TORRENT

## (undated) DEVICE — Device: Brand: DEFENDO AIR/WATER/SUCTION AND BIOPSY VALVE

## (undated) DEVICE — TUBING, SUCTION, 1/4" X 10', STRAIGHT: Brand: MEDLINE

## (undated) DEVICE — SINGLE-USE BIOPSY FORCEPS: Brand: RADIAL JAW 4